# Patient Record
Sex: FEMALE | Race: WHITE | NOT HISPANIC OR LATINO | Employment: UNEMPLOYED | URBAN - NONMETROPOLITAN AREA
[De-identification: names, ages, dates, MRNs, and addresses within clinical notes are randomized per-mention and may not be internally consistent; named-entity substitution may affect disease eponyms.]

---

## 2022-11-10 ENCOUNTER — TELEPHONE (OUTPATIENT)
Dept: CARDIOLOGY | Facility: CLINIC | Age: 57
End: 2022-11-10

## 2022-11-10 NOTE — TELEPHONE ENCOUNTER
PATIENT HAS ANEW PATIENT APPT. IN JAN. NOTIFIED HER TO CONTACT HER PCP TO ADDRESS B/P SINCE SHE IS NOT ESTABLISHED HERE YET. VERBALIZED OK. PH,LPN

## 2022-11-10 NOTE — TELEPHONE ENCOUNTER
Caller: Makayla Chan    Relationship to patient: Self    Best call back number: [phone]    Patient is needing: HUB WAS UNABLE TO TRANSFER TO CLINICAL TEAM. PT BP IS RUNNING AT ABOUT 147/42 AND THEN AGAIN 102/ 45. AND HAS BEEN GOING FROM LOW TO HIGH SINCE BEING SICK. PT HAS A NEW PATIENT APPT. SCHEDULED ON 1.3.22, AND PREVIOUSLY HAD AN APPT SCHEDULED ON 11.9.22 THAT WAS RESCHEDULED DUE TO BEING SICK. PLEASE ADVISE PT ON FURTHER STEPS THANK YOU.

## 2023-01-03 ENCOUNTER — OFFICE VISIT (OUTPATIENT)
Dept: CARDIOLOGY | Facility: CLINIC | Age: 58
End: 2023-01-03
Payer: COMMERCIAL

## 2023-01-03 ENCOUNTER — TELEPHONE (OUTPATIENT)
Dept: CARDIOLOGY | Facility: CLINIC | Age: 58
End: 2023-01-03
Payer: COMMERCIAL

## 2023-01-03 VITALS
HEART RATE: 83 BPM | OXYGEN SATURATION: 98 % | SYSTOLIC BLOOD PRESSURE: 129 MMHG | WEIGHT: 155 LBS | DIASTOLIC BLOOD PRESSURE: 78 MMHG

## 2023-01-03 DIAGNOSIS — R06.02 SHORTNESS OF BREATH: ICD-10-CM

## 2023-01-03 DIAGNOSIS — R07.2 PRECORDIAL PAIN: Primary | ICD-10-CM

## 2023-01-03 DIAGNOSIS — Z95.0 PACEMAKER: ICD-10-CM

## 2023-01-03 DIAGNOSIS — I10 PRIMARY HYPERTENSION: ICD-10-CM

## 2023-01-03 PROCEDURE — 1159F MED LIST DOCD IN RCRD: CPT | Performed by: PHYSICIAN ASSISTANT

## 2023-01-03 PROCEDURE — 1160F RVW MEDS BY RX/DR IN RCRD: CPT | Performed by: PHYSICIAN ASSISTANT

## 2023-01-03 PROCEDURE — 99204 OFFICE O/P NEW MOD 45 MIN: CPT | Performed by: PHYSICIAN ASSISTANT

## 2023-01-03 RX ORDER — HYDROCODONE BITARTRATE AND ACETAMINOPHEN 10; 325 MG/1; MG/1
TABLET ORAL
COMMUNITY
Start: 2022-12-19

## 2023-01-03 RX ORDER — NALOXONE HYDROCHLORIDE 4 MG/.1ML
1 SPRAY NASAL AS NEEDED
COMMUNITY

## 2023-01-03 RX ORDER — OMEPRAZOLE 20 MG/1
20 CAPSULE, DELAYED RELEASE ORAL DAILY
COMMUNITY

## 2023-01-03 RX ORDER — CLONAZEPAM 1 MG/1
TABLET ORAL
COMMUNITY
Start: 2022-12-22

## 2023-01-03 RX ORDER — ATORVASTATIN CALCIUM 20 MG/1
20 TABLET, FILM COATED ORAL DAILY
COMMUNITY
Start: 2022-12-27

## 2023-01-03 RX ORDER — MELOXICAM 7.5 MG/1
TABLET ORAL
COMMUNITY
Start: 2022-10-10

## 2023-01-03 RX ORDER — MELATONIN
800 DAILY
COMMUNITY

## 2023-01-03 RX ORDER — TIZANIDINE HYDROCHLORIDE 2 MG/1
2 CAPSULE, GELATIN COATED ORAL 2 TIMES DAILY PRN
COMMUNITY

## 2023-01-03 RX ORDER — LISINOPRIL AND HYDROCHLOROTHIAZIDE 20; 12.5 MG/1; MG/1
1 TABLET ORAL DAILY
COMMUNITY
Start: 2022-12-16 | End: 2023-03-09

## 2023-01-03 NOTE — PROGRESS NOTES
Subjective   Makayla Chan is a 57 y.o. female     Chief Complaint   Patient presents with   • Establish Care     Cardihcanell BARTON    The patient presents to the clinic today to establish cardiovascular care.  She has requested change of cardiac services, and presents today in that setting.  Previous history includes catheterization in approximately 2018 by report.  This supported nonobstructive disease at most.  The patient also had a pacemaker placement for severe symptomatic bradycardia/conduction system disease.  Last interrogation that I have available supported normal function with adequate battery life.  She has had history of hypokalemia which prompted discontinuation of diuretic therapy.  She feels that she has been mostly normotensive on medical regimen since.  Her last cardiac evaluation occurred approximately a year and a half ago where stress and echo studies were mostly unremarkable at that time.  She is mostly concerned because of recent symptoms.  The patient reports episodes of chest pain with associated mid back, bilateral neck pain, and bilateral ear pain.  This tends to originate in the ear and refer to the previous areas as mentioned.  She gets very dyspneic at that time.  She has a sensation of weakness and nausea at times as well with that.  Occasionally she will have palpitations but certainly no sustained dysrhythmic activity.  The patient reports no other symptoms and would like further evaluation, presenting today in that setting.    Current Outpatient Medications   Medication Sig Dispense Refill   • atorvastatin (LIPITOR) 20 MG tablet 20 mg Daily.     • cholecalciferol (VITAMIN D3) 25 MCG (1000 UT) tablet Take 800 Units by mouth Daily.     • clonazePAM (KlonoPIN) 1 MG tablet      • HYDROcodone-acetaminophen (NORCO)  MG per tablet      • lisinopril-hydrochlorothiazide (PRINZIDE,ZESTORETIC) 20-12.5 MG per tablet 1 tablet Daily.     • meloxicam (MOBIC) 7.5 MG tablet      •  naloxone (NARCAN) 4 MG/0.1ML nasal spray 1 spray into the nostril(s) as directed by provider As Needed.     • omeprazole (priLOSEC) 20 MG capsule Take 20 mg by mouth Daily.     • TiZANidine (ZANAFLEX) 2 MG capsule Take 2 mg by mouth 2 (Two) Times a Day As Needed for Muscle Spasms.       No current facility-administered medications for this visit.       Oxycodone    Past Medical History:   Diagnosis Date   • Nerve damage     Damian hands and arms       Social History     Socioeconomic History   • Marital status: Single   Tobacco Use   • Smoking status: Former     Types: Cigarettes     Quit date:      Years since quittin.0   • Smokeless tobacco: Never   Substance and Sexual Activity   • Alcohol use: Never   • Drug use: Never   • Sexual activity: Defer       Family History   Problem Relation Age of Onset   • Heart disease Mother    • Heart disease Father        Review of Systems   Eyes: Positive for visual disturbance ( glaucoma).   Respiratory: Positive for chest tightness and wheezing. Negative for apnea, cough and shortness of breath.    Cardiovascular: Positive for leg swelling. Negative for chest pain and palpitations.   Gastrointestinal: Negative for blood in stool.   Endocrine: Positive for cold intolerance and heat intolerance.   Genitourinary: Negative for hematuria.   Musculoskeletal: Positive for arthralgias, back pain, gait problem, joint swelling, neck pain and neck stiffness.   Skin: Negative.  Negative for color change, rash and wound.   Allergic/Immunologic: Negative.  Negative for environmental allergies and food allergies.   Neurological: Positive for dizziness. Negative for syncope, weakness, light-headedness, numbness and headaches.   Hematological: Does not bruise/bleed easily.   Psychiatric/Behavioral: Positive for sleep disturbance.       Objective     Vitals:    23 1326   BP: 129/78   BP Location: Left arm   Patient Position: Sitting   Cuff Size: Adult   Pulse: 83   SpO2: 98%    Weight: 70.3 kg (155 lb)        /78 (BP Location: Left arm, Patient Position: Sitting, Cuff Size: Adult)   Pulse 83   Wt 70.3 kg (155 lb)   SpO2 98%      Lab Results (most recent)     None          Physical Exam  Vitals and nursing note reviewed.   Constitutional:       General: She is not in acute distress.     Appearance: She is well-developed.   HENT:      Head: Normocephalic and atraumatic.   Eyes:      Conjunctiva/sclera: Conjunctivae normal.      Pupils: Pupils are equal, round, and reactive to light.   Neck:      Vascular: No JVD.      Trachea: No tracheal deviation.   Cardiovascular:      Rate and Rhythm: Normal rate and regular rhythm.      Heart sounds: Normal heart sounds.   Pulmonary:      Effort: Pulmonary effort is normal.      Breath sounds: Normal breath sounds.   Abdominal:      General: Bowel sounds are normal. There is no distension.      Palpations: Abdomen is soft. There is no mass.      Tenderness: There is no abdominal tenderness. There is no guarding or rebound.   Musculoskeletal:         General: No tenderness or deformity. Normal range of motion.      Cervical back: Normal range of motion and neck supple.   Skin:     General: Skin is warm and dry.      Coloration: Skin is not pale.      Findings: No erythema or rash.   Neurological:      Mental Status: She is alert and oriented to person, place, and time.   Psychiatric:         Behavior: Behavior normal.         Thought Content: Thought content normal.         Judgment: Judgment normal.         Procedure   Procedures         Assessment & Plan      Diagnosis Plan   1. Precordial pain  Overnight Sleep Oximetry Study    Stress Test With Myocardial Perfusion (1 Day)    Adult Transthoracic Echo Complete W/ Cont if Necessary Per Protocol      2. Shortness of breath  Overnight Sleep Oximetry Study    Stress Test With Myocardial Perfusion (1 Day)    Adult Transthoracic Echo Complete W/ Cont if Necessary Per Protocol      3. Pacemaker         4. Primary hypertension          1.  With chest discomfort and the patient's concern of coronary disease, I would like to repeat an ischemia assessment.  We will schedule for nuclear stress test.  Given pacemaker status and exercise intolerance, we will schedule for Lexiscan nuclear stress test.    2.  I would schedule for an echo as well to evaluate LV size and function, valvular morphologies, and cardiac structure otherwise.    3.  We will schedule the patient for routine pacer interrogations through the clinic.    4.  Not noted above, because of her level of fatigue and snoring at night otherwise, we will schedule for overnight oximetry to screen for undiagnosed sleep disorder.    5.  We will continue medications for now without change.    6.  We will see the patient back to review all the above and recommend further at that time.  She will call for any complications prior to follow-up.  If the above is unremarkable and symptoms persist, I would then consider empiric therapy directed towards vasospastic angina.           Patient brought in medicine list to appointment, it's been reviewed with patient and med list was updated in the chart.       Electronically signed by:

## 2023-01-06 ENCOUNTER — OFFICE VISIT (OUTPATIENT)
Dept: CARDIOLOGY | Facility: CLINIC | Age: 58
End: 2023-01-06
Payer: COMMERCIAL

## 2023-01-06 DIAGNOSIS — I45.9 CARDIAC CONDUCTION DISORDER: Primary | ICD-10-CM

## 2023-01-06 PROCEDURE — 93280 PM DEVICE PROGR EVAL DUAL: CPT | Performed by: INTERNAL MEDICINE

## 2023-01-18 ENCOUNTER — TELEPHONE (OUTPATIENT)
Dept: CARDIOLOGY | Facility: CLINIC | Age: 58
End: 2023-01-18
Payer: COMMERCIAL

## 2023-01-18 NOTE — TELEPHONE ENCOUNTER
----- Message from GARY Williamson sent at 1/9/2023  6:51 PM EST -----  No results available.     ----- Message -----  From: Florencia North RegSched Rep  Sent: 1/6/2023  11:12 AM EST  To: GARY Williamson

## 2023-01-23 ENCOUNTER — TELEPHONE (OUTPATIENT)
Dept: CARDIOLOGY | Facility: CLINIC | Age: 58
End: 2023-01-23
Payer: COMMERCIAL

## 2023-01-23 NOTE — TELEPHONE ENCOUNTER
[facility]  Pt notified of no acute findings. Provider will discuss results at f/u. Pt reminded of appt date and time.  ----- Message from Chikis Paiz MA sent at 1/23/2023  8:26 AM EST -----    ----- Message -----  From: Quoc Gilman PA  Sent: 1/20/2023   1:55 PM EST  To: Chikis Paiz MA    Routine follow-up.  If symptoms suggested, proceed on with pulmonary evaluation  ----- Message -----  From: Interface, Scans Incoming  Sent: 1/19/2023   5:49 AM EST  To: GARY Williamson

## 2023-02-13 ENCOUNTER — HOSPITAL ENCOUNTER (OUTPATIENT)
Dept: CARDIOLOGY | Facility: HOSPITAL | Age: 58
Discharge: HOME | End: 2023-02-13
Payer: COMMERCIAL

## 2023-02-13 DIAGNOSIS — R06.02 SHORTNESS OF BREATH: ICD-10-CM

## 2023-02-13 DIAGNOSIS — R07.2 PRECORDIAL PAIN: ICD-10-CM

## 2023-02-13 PROCEDURE — 93017 CV STRESS TEST TRACING ONLY: CPT

## 2023-02-13 PROCEDURE — 78452 HT MUSCLE IMAGE SPECT MULT: CPT | Performed by: INTERNAL MEDICINE

## 2023-02-13 PROCEDURE — 0 TECHNETIUM SESTAMIBI: Performed by: INTERNAL MEDICINE

## 2023-02-13 PROCEDURE — 93306 TTE W/DOPPLER COMPLETE: CPT

## 2023-02-13 PROCEDURE — 78452 HT MUSCLE IMAGE SPECT MULT: CPT

## 2023-02-13 PROCEDURE — A9500 TC99M SESTAMIBI: HCPCS | Performed by: INTERNAL MEDICINE

## 2023-02-13 PROCEDURE — 93018 CV STRESS TEST I&R ONLY: CPT | Performed by: INTERNAL MEDICINE

## 2023-02-13 PROCEDURE — 93306 TTE W/DOPPLER COMPLETE: CPT | Performed by: INTERNAL MEDICINE

## 2023-02-13 PROCEDURE — 25010000002 REGADENOSON 0.4 MG/5ML SOLUTION: Performed by: INTERNAL MEDICINE

## 2023-02-13 RX ADMIN — REGADENOSON 0.4 MG: 0.08 INJECTION, SOLUTION INTRAVENOUS at 10:16

## 2023-02-13 RX ADMIN — TECHNETIUM TC 99M SESTAMIBI 1 DOSE: 1 INJECTION INTRAVENOUS at 10:16

## 2023-02-13 RX ADMIN — TECHNETIUM TC 99M SESTAMIBI 1 DOSE: 1 INJECTION INTRAVENOUS at 08:07

## 2023-02-15 LAB
BH CV REST NUCLEAR ISOTOPE DOSE: 10 MCI
BH CV STRESS COMMENTS STAGE 1: NORMAL
BH CV STRESS DOSE REGADENOSON STAGE 1: 0.4
BH CV STRESS DURATION MIN STAGE 1: 0
BH CV STRESS DURATION SEC STAGE 1: 10
BH CV STRESS NUCLEAR ISOTOPE DOSE: 30 MCI
BH CV STRESS PROTOCOL 1: NORMAL
BH CV STRESS RECOVERY BP: NORMAL MMHG
BH CV STRESS RECOVERY HR: 72 BPM
BH CV STRESS STAGE 1: 1
MAXIMAL PREDICTED HEART RATE: 161 BPM
PERCENT MAX PREDICTED HR: 44.1 %
STRESS BASELINE BP: NORMAL MMHG
STRESS BASELINE HR: 60 BPM
STRESS PERCENT HR: 52 %
STRESS POST PEAK BP: NORMAL MMHG
STRESS POST PEAK HR: 71 BPM
STRESS TARGET HR: 137 BPM

## 2023-02-18 LAB
BH CV ECHO MEAS - ACS: 1.95 CM
BH CV ECHO MEAS - AO MAX PG: 8.8 MMHG
BH CV ECHO MEAS - AO MEAN PG: 5.4 MMHG
BH CV ECHO MEAS - AO ROOT DIAM: 3.3 CM
BH CV ECHO MEAS - AO V2 MAX: 148 CM/SEC
BH CV ECHO MEAS - AO V2 VTI: 33.1 CM
BH CV ECHO MEAS - EDV(CUBED): 94.8 ML
BH CV ECHO MEAS - EDV(MOD-SP4): 94.1 ML
BH CV ECHO MEAS - EF(MOD-SP4): 58.3 %
BH CV ECHO MEAS - EF_3D-VOL: 62 %
BH CV ECHO MEAS - ESV(CUBED): 35.6 ML
BH CV ECHO MEAS - ESV(MOD-SP4): 39.2 ML
BH CV ECHO MEAS - FS: 27.9 %
BH CV ECHO MEAS - IVS/LVPW: 0.7 CM
BH CV ECHO MEAS - IVSD: 0.82 CM
BH CV ECHO MEAS - LA DIMENSION: 3.1 CM
BH CV ECHO MEAS - LAT PEAK E' VEL: 6.2 CM/SEC
BH CV ECHO MEAS - LV MASS(C)D: 153.9 GRAMS
BH CV ECHO MEAS - LVIDD: 4.6 CM
BH CV ECHO MEAS - LVIDS: 3.3 CM
BH CV ECHO MEAS - LVPWD: 1.16 CM
BH CV ECHO MEAS - MED PEAK E' VEL: 5.8 CM/SEC
BH CV ECHO MEAS - MR MAX PG: 99.3 MMHG
BH CV ECHO MEAS - MR MAX VEL: 498.2 CM/SEC
BH CV ECHO MEAS - MV A MAX VEL: 85.3 CM/SEC
BH CV ECHO MEAS - MV DEC SLOPE: 319.7 CM/SEC2
BH CV ECHO MEAS - MV E MAX VEL: 87 CM/SEC
BH CV ECHO MEAS - MV E/A: 1.02
BH CV ECHO MEAS - RAP SYSTOLE: 10 MMHG
BH CV ECHO MEAS - RVDD: 2.17 CM
BH CV ECHO MEAS - RVSP: 29.3 MMHG
BH CV ECHO MEAS - SV(MOD-SP4): 54.9 ML
BH CV ECHO MEAS - TR MAX PG: 19.3 MMHG
BH CV ECHO MEAS - TR MAX VEL: 219.9 CM/SEC
BH CV ECHO MEASUREMENTS AVERAGE E/E' RATIO: 14.5
MAXIMAL PREDICTED HEART RATE: 161 BPM
STRESS TARGET HR: 137 BPM

## 2023-02-20 ENCOUNTER — TELEPHONE (OUTPATIENT)
Dept: CARDIOLOGY | Facility: CLINIC | Age: 58
End: 2023-02-20
Payer: COMMERCIAL

## 2023-02-20 DIAGNOSIS — R07.2 PRECORDIAL PAIN: ICD-10-CM

## 2023-02-20 DIAGNOSIS — R94.39 ABNORMAL STRESS TEST: Primary | ICD-10-CM

## 2023-02-20 DIAGNOSIS — R06.02 SHORTNESS OF BREATH: ICD-10-CM

## 2023-02-20 RX ORDER — METOPROLOL TARTRATE 100 MG/1
TABLET ORAL
Qty: 2 TABLET | Refills: 0 | Status: SHIPPED | OUTPATIENT
Start: 2023-02-20

## 2023-02-20 RX ORDER — NITROGLYCERIN 0.4 MG/1
TABLET SUBLINGUAL
Qty: 25 TABLET | Refills: 0 | Status: SHIPPED | OUTPATIENT
Start: 2023-02-20

## 2023-02-20 NOTE — TELEPHONE ENCOUNTER
Pt LVM on triage requesting her test results. Also stated that she had CP Thursday and has to take nitro. Stated she wanted to know if her results explain why that's occurring.   #[phone]        Per chart review, results are in and ischemia is mentioned on stress test.

## 2023-02-20 NOTE — TELEPHONE ENCOUNTER
Patient has been notified of test results/ instructions and is willing to move forward with testing. Denies contrast dye allergy.    Chikis Paiz MA

## 2023-02-20 NOTE — TELEPHONE ENCOUNTER
----- Message from GARY Williamson sent at 2/17/2023  4:27 PM EST -----  Cardiac CTA.  ----- Message -----  From: Deniz Lucio MD  Sent: 2/15/2023   7:57 PM EST  To: GARY Williamson    Result Text  1.  Scintigraphy demonstrates a small, mild reversible defect compatible with ischemia confined to the apical segment.    2.  Preserved post stress ejection fraction of 57% with no focal wall motion abnormalities.    3.  No evidence of pharmacologically induced transient ischemic dilation or of increased lung uptake of radiopharmaceutical.

## 2023-02-27 ENCOUNTER — TELEPHONE (OUTPATIENT)
Dept: CARDIOLOGY | Facility: CLINIC | Age: 58
End: 2023-02-27
Payer: COMMERCIAL

## 2023-02-27 NOTE — TELEPHONE ENCOUNTER
----- Message from GARY Williamson sent at 2/24/2023  3:04 PM EST -----  CT angiogram pending.  Follow-up after.  ----- Message -----  From: Deniz Lucio MD  Sent: 2/18/2023  10:25 PM EST  To: GARY Williamson        Result Text     •  Left ventricular ejection fraction appears to be 56 - 60%.  •  Left ventricular diastolic function is consistent with (grade II w/high LAP) pseudonormalization.  •  The right atrial cavity is mildly  dilated.  •  Estimated right ventricular systolic pressure from tricuspid regurgitation is normal (<35 mmHg).     Technically adequate study.    1.  LV size, function, wall motion, wall thickness are normal with LV systolic function at the lower limits of normal.  Visually estimated ejection fraction is 45 to 50%.  3D ejection fraction is 62%.  Grade 2 diastolic dysfunction.  Mild left atrial enlargement.  Mild right atrial enlargement.  RV size and function are grossly preserved.  Pacing or defibrillator leads are identified in the right heart.    2.  Valves are morphologically normal with no stenotic lesions or valve associated masses or thrombi.  There is mild to moderate MR, trivial AI, and trivial TR.    3.  No pericardial or great vessel pathology.    4.  Pulmonary artery systolic pressures are estimated approximately 30 mmHg

## 2023-03-09 ENCOUNTER — OFFICE VISIT (OUTPATIENT)
Dept: CARDIOLOGY | Facility: CLINIC | Age: 58
End: 2023-03-09
Payer: COMMERCIAL

## 2023-03-09 VITALS
WEIGHT: 149 LBS | SYSTOLIC BLOOD PRESSURE: 148 MMHG | DIASTOLIC BLOOD PRESSURE: 86 MMHG | HEART RATE: 72 BPM | OXYGEN SATURATION: 97 %

## 2023-03-09 DIAGNOSIS — R06.02 SHORTNESS OF BREATH: Primary | ICD-10-CM

## 2023-03-09 DIAGNOSIS — R94.39 ABNORMAL STRESS TEST: ICD-10-CM

## 2023-03-09 DIAGNOSIS — R07.2 PRECORDIAL PAIN: ICD-10-CM

## 2023-03-09 DIAGNOSIS — I10 PRIMARY HYPERTENSION: ICD-10-CM

## 2023-03-09 PROCEDURE — 3077F SYST BP >= 140 MM HG: CPT | Performed by: PHYSICIAN ASSISTANT

## 2023-03-09 PROCEDURE — 3079F DIAST BP 80-89 MM HG: CPT | Performed by: PHYSICIAN ASSISTANT

## 2023-03-09 PROCEDURE — 99214 OFFICE O/P EST MOD 30 MIN: CPT | Performed by: PHYSICIAN ASSISTANT

## 2023-03-09 PROCEDURE — 93000 ELECTROCARDIOGRAM COMPLETE: CPT | Performed by: PHYSICIAN ASSISTANT

## 2023-03-09 RX ORDER — RANOLAZINE 1000 MG/1
1000 TABLET, EXTENDED RELEASE ORAL 2 TIMES DAILY
Qty: 60 TABLET | Refills: 5 | Status: SHIPPED | OUTPATIENT
Start: 2023-03-09

## 2023-03-09 RX ORDER — LISINOPRIL 40 MG/1
40 TABLET ORAL DAILY
COMMUNITY

## 2023-03-09 RX ORDER — ASPIRIN 81 MG/1
81 TABLET ORAL DAILY
Qty: 30 TABLET | Refills: 5 | Status: SHIPPED | OUTPATIENT
Start: 2023-03-09

## 2023-03-09 NOTE — PROGRESS NOTES
"Problem list     Subjective   Makayla Chan is a 58 y.o. female     Chief Complaint   Patient presents with   • Chest Pain   Problem list  1.  Nonobstructive coronary disease  1.1 stress test February 2023 with apical ischemia noted and preserved LV function  2.  Preserved systolic function  3.  Hypertension  4.  Dyslipidemia  5.  Symptomatic bradycardia status post pacemaker implant      HPI    Patient is a 58-year-old female who presented into the office today emergently because of chest pain.    She apparently was to come in for a nurse visit but was having significant pain.  She apparently during the night felt discomfort in her head that seem to go down into her chest.  She become diaphoretic and felt heaviness.  She become short of breath and symptoms have been very concerning.  She presents today for evaluation.    She describes her pain currently as a \"3\" on a 1-10 pain scale.  She was given nitroglycerin here in the emergency department and that has improved her symptoms although it is still present.    She does not describe being short of breath now.  She does not describe PND orthopnea.  No complaints of palpitations or dysrhythmic symptoms.  Otherwise, patient is doing well.    Current Outpatient Medications on File Prior to Visit   Medication Sig Dispense Refill   • atorvastatin (LIPITOR) 20 MG tablet 1 tablet Daily.     • cholecalciferol (VITAMIN D3) 25 MCG (1000 UT) tablet Take 800 Units by mouth Daily.     • clonazePAM (KlonoPIN) 1 MG tablet      • HYDROcodone-acetaminophen (NORCO)  MG per tablet      • lisinopril (PRINIVIL,ZESTRIL) 40 MG tablet Take 1 tablet by mouth Daily.     • meloxicam (MOBIC) 7.5 MG tablet      • metoprolol tartrate (LOPRESSOR) 100 MG tablet Take one tablet at 7 am morning of CTA. Radiology will let you know when the to take the other tablet. 2 tablet 0   • naloxone (NARCAN) 4 MG/0.1ML nasal spray 1 spray into the nostril(s) as directed by provider As Needed.     • " nitroglycerin (NITROSTAT) 0.4 MG SL tablet 1 under the tongue as needed for angina, may repeat q5mins for up three doses 25 tablet 0   • omeprazole (priLOSEC) 20 MG capsule Take 1 capsule by mouth Daily.     • TiZANidine (ZANAFLEX) 2 MG capsule Take 1 capsule by mouth 2 (Two) Times a Day As Needed for Muscle Spasms.     • [DISCONTINUED] lisinopril-hydrochlorothiazide (PRINZIDE,ZESTORETIC) 20-12.5 MG per tablet 1 tablet Daily.       No current facility-administered medications on file prior to visit.       Oxycodone    Past Medical History:   Diagnosis Date   • Nerve damage     Damian hands and arms       Social History     Socioeconomic History   • Marital status: Single   Tobacco Use   • Smoking status: Former     Types: Cigarettes     Quit date:      Years since quittin.   • Smokeless tobacco: Never   Substance and Sexual Activity   • Alcohol use: Never   • Drug use: Never   • Sexual activity: Defer       Family History   Problem Relation Age of Onset   • Heart disease Mother    • Heart disease Father        Review of Systems   Constitutional: Negative for activity change, appetite change, chills and fever.   HENT: Negative for congestion.    Eyes: Negative.  Negative for visual disturbance.   Respiratory: Negative for apnea and chest tightness.    Cardiovascular: Positive for chest pain (Pain started last PM). Negative for palpitations and leg swelling.   Gastrointestinal: Negative.  Negative for blood in stool.   Endocrine: Positive for cold intolerance and heat intolerance.   Genitourinary: Negative.  Negative for hematuria.   Skin: Negative for color change, rash and wound.   Allergic/Immunologic: Negative.  Negative for environmental allergies and food allergies.   Neurological: Positive for dizziness, weakness, light-headedness and headaches. Negative for syncope and numbness.   Hematological: Negative.  Does not bruise/bleed easily.   Psychiatric/Behavioral: Positive for sleep disturbance.        Objective   Vitals:    03/09/23 1020   BP: 148/86   BP Location: Left arm   Patient Position: Sitting   Cuff Size: Adult   Pulse: 72   SpO2: 97%   Weight: 67.6 kg (149 lb)      /86 (BP Location: Left arm, Patient Position: Sitting, Cuff Size: Adult)   Pulse 72   Wt 67.6 kg (149 lb)   SpO2 97%    Lab Results (most recent)     None        Physical Exam  Vitals and nursing note reviewed.   Constitutional:       General: She is not in acute distress.     Appearance: Normal appearance. She is well-developed.   HENT:      Head: Normocephalic and atraumatic.   Eyes:      General: No scleral icterus.        Right eye: No discharge.         Left eye: No discharge.      Conjunctiva/sclera: Conjunctivae normal.   Neck:      Vascular: No carotid bruit.   Cardiovascular:      Rate and Rhythm: Normal rate and regular rhythm.      Heart sounds: Normal heart sounds. No murmur heard.    No friction rub. No gallop.   Pulmonary:      Effort: Pulmonary effort is normal. No respiratory distress.      Breath sounds: Normal breath sounds. No wheezing or rales.   Chest:      Chest wall: No tenderness.   Musculoskeletal:      Right lower leg: No edema.      Left lower leg: No edema.   Skin:     General: Skin is warm and dry.      Coloration: Skin is not pale.      Findings: No erythema or rash.   Neurological:      Mental Status: She is alert and oriented to person, place, and time.      Cranial Nerves: No cranial nerve deficit.   Psychiatric:         Behavior: Behavior normal.           Procedure     ECG 12 Lead    Date/Time: 3/9/2023 9:50 AM  Performed by: Sav Sotelo PA  Authorized by: Sav Sotelo PA   Comparison: compared with previous ECG from 3/9/2023  Comments: EKG demonstrates ventricular pacing with no acute ST changes               Assessment & Plan      Diagnosis Plan   1. Shortness of breath        2. Abnormal stress test  CT Angiogram Coronary      3. Precordial pain  CT Angiogram Coronary      4.  Primary hypertension            Recommendation  1.  Patient is a 58-year-old female who presents back to the office for follow-up.  Patient has chest pain, shortness of breath with current chest pain at this time.  We are sending her to the emergency room for evaluation.  We have called report to them at this time.    2.  Otherwise, patient will be seen after hospitalization.  Follow-up with primary as scheduled.         Patient did not bring med list or medicine bottles to appointment, med list has been reviewed and updated based on patient's knowledge of their meds.         Electronically signed by:  Sav Sotelo

## 2023-03-27 ENCOUNTER — TELEPHONE (OUTPATIENT)
Dept: CARDIOLOGY | Facility: CLINIC | Age: 58
End: 2023-03-27
Payer: COMMERCIAL

## 2023-03-27 DIAGNOSIS — R94.39 ABNORMAL STRESS TEST: ICD-10-CM

## 2023-03-27 DIAGNOSIS — R07.2 PRECORDIAL PAIN: ICD-10-CM

## 2023-03-27 DIAGNOSIS — R06.02 SHORTNESS OF BREATH: ICD-10-CM

## 2023-03-27 DIAGNOSIS — R93.89 ABNORMAL CT SCAN: Primary | ICD-10-CM

## 2023-03-27 NOTE — TELEPHONE ENCOUNTER
PT called to check on CTA results, told patient we did not have reuslts that I would pull from hospital and let Quoc know. I have scanned and attached the report, they are in patient's chart for Quoc to review and call patient. Thank you.

## 2023-03-28 NOTE — TELEPHONE ENCOUNTER
Per Quoc Gilman,PAC Verbal would recommend moving forward with cath at this time. Patient verbally understands and is willing to move forward.

## 2023-03-30 NOTE — TELEPHONE ENCOUNTER
Per Quoc Gilman,PAC Verbal okay to wait for  as long as she is not having any sx. Patient has been notified verbally understands.    Pt was offered a sooner cardiac cath appointment with an alternate interventional cardiologist (Gonzales Hare Tahir, Rukavina or [facility]).  Pt declined and opted to wait for Dr. Lucio's next cardiac cath appointment.  The provider explained the risk associated with delaying the cardiac cath and encouraged the patient to have the cardiac cath performed ASAP with the alternate provider.      Patient is going to come in, in the am to go over instructions.    SAE HURST GAVE INSTRUCTIONS AS I WAS OUT OF OFFICE WHEN PATIENT ARRIVED.

## 2023-04-17 ENCOUNTER — TELEPHONE (OUTPATIENT)
Dept: CARDIOLOGY | Facility: CLINIC | Age: 58
End: 2023-04-17
Payer: COMMERCIAL

## 2023-04-17 NOTE — TELEPHONE ENCOUNTER
Caller: Makayla Chan    Relationship: Self    Best call back number: [phone]    What orders are you requesting (i.e. lab or imaging): PRE-CATH LABS    In what timeframe would the patient need to come in: BETWEEN 4.19 & 5.04    Where will you receive your lab/imaging services: [facility] - FAX NUMBER [phone] - PHONE [phone]    Additional notes: HAS AN APPT W/ [facility] 5.2.23

## 2023-05-19 ENCOUNTER — OUTSIDE FACILITY SERVICE (OUTPATIENT)
Dept: CARDIOLOGY | Facility: CLINIC | Age: 58
End: 2023-05-19
Payer: COMMERCIAL

## 2023-05-19 DIAGNOSIS — R06.02 SHORTNESS OF BREATH: ICD-10-CM

## 2023-05-19 DIAGNOSIS — R07.2 PRECORDIAL PAIN: ICD-10-CM

## 2023-05-19 DIAGNOSIS — R94.39 ABNORMAL STRESS TEST: ICD-10-CM

## 2023-05-19 DIAGNOSIS — R93.89 ABNORMAL CT SCAN: ICD-10-CM

## 2023-06-19 ENCOUNTER — OFFICE VISIT (OUTPATIENT)
Dept: CARDIOLOGY | Facility: CLINIC | Age: 58
End: 2023-06-19
Payer: COMMERCIAL

## 2023-06-19 VITALS
DIASTOLIC BLOOD PRESSURE: 84 MMHG | WEIGHT: 162 LBS | OXYGEN SATURATION: 96 % | HEART RATE: 89 BPM | SYSTOLIC BLOOD PRESSURE: 154 MMHG

## 2023-06-19 DIAGNOSIS — I10 PRIMARY HYPERTENSION: ICD-10-CM

## 2023-06-19 DIAGNOSIS — R07.2 PRECORDIAL PAIN: Primary | ICD-10-CM

## 2023-06-19 DIAGNOSIS — R06.02 SHORTNESS OF BREATH: ICD-10-CM

## 2023-06-19 PROCEDURE — 99213 OFFICE O/P EST LOW 20 MIN: CPT | Performed by: PHYSICIAN ASSISTANT

## 2023-06-19 PROCEDURE — 1160F RVW MEDS BY RX/DR IN RCRD: CPT | Performed by: PHYSICIAN ASSISTANT

## 2023-06-19 PROCEDURE — 3077F SYST BP >= 140 MM HG: CPT | Performed by: PHYSICIAN ASSISTANT

## 2023-06-19 PROCEDURE — 1159F MED LIST DOCD IN RCRD: CPT | Performed by: PHYSICIAN ASSISTANT

## 2023-06-19 PROCEDURE — 3079F DIAST BP 80-89 MM HG: CPT | Performed by: PHYSICIAN ASSISTANT

## 2023-06-19 RX ORDER — ISOSORBIDE MONONITRATE 30 MG/1
30 TABLET, EXTENDED RELEASE ORAL EVERY MORNING
Qty: 90 TABLET | Refills: 3 | Status: SHIPPED | OUTPATIENT
Start: 2023-06-19

## 2023-06-19 RX ORDER — PHENOL 1.4 %
800 AEROSOL, SPRAY (ML) MUCOUS MEMBRANE DAILY
COMMUNITY
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
## 2023-09-15 ENCOUNTER — TELEPHONE (OUTPATIENT)
Dept: CARDIOLOGY | Facility: CLINIC | Age: 58
End: 2023-09-15
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
## 2023-09-18 ENCOUNTER — TELEPHONE (OUTPATIENT)
Dept: CARDIOLOGY | Facility: CLINIC | Age: 58
End: 2023-09-18
Payer: COMMERCIAL

## 2023-09-18 NOTE — TELEPHONE ENCOUNTER
Caller: Makayla Chan    Relationship: Self    Best call back number: [phone]     What is the best time to reach you: ANYTIME    What was the call regarding: PATIENT MISSED A CALL ON 09.18.23. THERE IS NO NOTE IN THE CHART.

## 2023-09-18 NOTE — TELEPHONE ENCOUNTER
Called lvm of new appt date and times. Unable to get both on same day this time. Mailed out reminders as well.

## 2023-10-06 ENCOUNTER — OFFICE VISIT (OUTPATIENT)
Dept: CARDIOLOGY | Facility: CLINIC | Age: 58
End: 2023-10-06
Payer: COMMERCIAL

## 2023-10-06 DIAGNOSIS — I45.9 CARDIAC CONDUCTION DISORDER: ICD-10-CM

## 2023-10-06 DIAGNOSIS — Z95.0 PACEMAKER: Primary | ICD-10-CM

## 2023-10-12 ENCOUNTER — TELEPHONE (OUTPATIENT)
Dept: CARDIOLOGY | Facility: CLINIC | Age: 58
End: 2023-10-12
Payer: COMMERCIAL

## 2023-10-12 NOTE — LETTER
October 16, 2023       To Whom It May Concern:    We build our practice on integrity and patient care. The highest compliment we can receive is the referral of friends, family and patients to our office. We want to take this time to thank you for considering our group as part of your care team.    The medical records for Makayla Chan 1965 were reviewed for pre-operative clearance on 10/16/2023. It has been determined that this patient has:  ACCEPTABLE RISK.    Makayla Chan is currently on the following medications that will need to be held prior to surgery: MAY HOLD ASPIRIN FOR 2 DAYS PRIOR TO PROCEDURE IF NEEDED.    This pre-operative evaluation has been completed based on patient reported medical conditions at the date of this letter, this evaluation may have considered in part results of additional testing, completion of an EKG and patient reported symptoms at the time of their visit.    Makayla Chan's pre-operative clearance is good for thirty(30) days from the date of this letter with no reported changes in health, symptoms or diagnosis from the patient, their primary care provider or your office.    Your office has reported the patient will under go ORAL SURGERY WITH LOCAL ANESTHESIA on TO BE DETERMINED with Dr. TORRES. If this appointment is changed or moved outside of thirty(30) days from 10/16/2023 this pre-operative clearance is void.    If you have any further questions please give our office a call.    Thank you for your trust,      GARY Harrington

## 2023-10-12 NOTE — TELEPHONE ENCOUNTER
Received cardiac clearance request from  stating pt has oral surgery to be scheduled and is requiring a cardiac clearance. Placed cardiac clearance request in Christina's inbox to review and address with provider.

## 2023-11-28 RX ORDER — ISOSORBIDE MONONITRATE 30 MG/1
30 TABLET, EXTENDED RELEASE ORAL EVERY MORNING
Qty: 90 TABLET | Refills: 3 | Status: SHIPPED | OUTPATIENT
Start: 2023-11-28

## 2023-11-28 NOTE — TELEPHONE ENCOUNTER
Caller: Makayla Chan    Relationship: Self    Best call back number: [phone]    Requested Prescriptions:   Requested Prescriptions     Pending Prescriptions Disp Refills    isosorbide mononitrate (IMDUR) 30 MG 24 hr tablet 90 tablet 3     Sig: Take 1 tablet by mouth Every Morning.        Pharmacy where request should be sent: [facility] [id number] [place] [id number] - [phone] [facility] [phone]      Last office visit with prescribing clinician: 6/19/2023   Last telemedicine visit with prescribing clinician: Visit date not found   Next office visit with prescribing clinician: 5/16/2024     Additional details provided by patient: PATIENT HAS 21 PILLS LEFT    Does the patient have less than a 3 day supply:  [] Yes  [x] No    Would you like a call back once the refill request has been completed: [] Yes [] No    If the office needs to give you a call back, can they leave a voicemail: [] Yes [] No    Kristie Madrid Rep   11/28/23 11:59 EST

## 2023-11-28 NOTE — LETTER
November 29, 2023       To Whom It May Concern:    We build our practice on integrity and patient care. The highest compliment we can receive is the referral of friends, family and patients to our office. We want to take this time to thank you for considering our group as part of your care team.    The medical records for Makayla Chan 1965 were reviewed for pre-operative clearance on 11/29/2023. It has been determined that this patient has:  ACCEPTABLE RISK.    Makayla Chan is currently on the following medications that will need to be held prior to surgery: MAY HOLD ASPIRIN IF NEEDED 2 TO 3 DAYS PRIOR TO PROCEDURE.    This pre-operative evaluation has been completed based on patient reported medical conditions at the date of this letter, this evaluation may have considered in part results of additional testing, completion of an EKG and patient reported symptoms at the time of their visit.    Makayla Chan's pre-operative clearance is good for thirty(30) days from the date of this letter with no reported changes in health, symptoms or diagnosis from the patient, their primary care provider or your office.    Your office has reported the patient will under go ORAL SURGERY  on TO BE DETERMINED with Dr. TORRES. If this appointment is changed or moved outside of thirty(30) days from 11/29/2023 this pre-operative clearance is void.    If you have any further questions please give our office a call.    Thank you for your trust,      GARY Harrington

## 2023-12-21 ENCOUNTER — TELEPHONE (OUTPATIENT)
Dept: CARDIOLOGY | Facility: CLINIC | Age: 58
End: 2023-12-21
Payer: COMMERCIAL

## 2023-12-21 NOTE — LETTER
December 22, 2023       To Whom It May Concern:    We build our practice on integrity and patient care. The highest compliment we can receive is the referral of friends, family and patients to our office. We want to take this time to thank you for considering our group as part of your care team.    The medical records for Makayla Chan 1965 were reviewed for pre-operative clearance on 12/22/2023. It has been determined that this patient has:  ACCEPTABLE RISK.    Makayla Chan is currently on the following medications that will need to be held prior to surgery: CONTINUE ASPIRIN DAILY.    This pre-operative evaluation has been completed based on patient reported medical conditions at the date of this letter, this evaluation may have considered in part results of additional testing, completion of an EKG and patient reported symptoms at the time of their visit.    Makayla Chan's pre-operative clearance is good for thirty(30) days from the date of this letter with no reported changes in health, symptoms or diagnosis from the patient, their primary care provider or your office.    Your office has reported the patient will under go ORAL SURGERY WITH LOCAL ANESTHESIA on TO BE DETERMINED with Dr. TORRES. If this appointment is changed or moved outside of thirty(30) days from 12/22/2023 this pre-operative clearance is void.    If you have any further questions please give our office a call.    Thank you for your trust,      GARY Harrington

## 2024-08-15 ENCOUNTER — OFFICE VISIT (OUTPATIENT)
Dept: CARDIOLOGY | Facility: CLINIC | Age: 59
End: 2024-08-15
Payer: COMMERCIAL

## 2024-08-15 VITALS
WEIGHT: 157 LBS | DIASTOLIC BLOOD PRESSURE: 63 MMHG | OXYGEN SATURATION: 98 % | HEART RATE: 93 BPM | SYSTOLIC BLOOD PRESSURE: 110 MMHG

## 2024-08-15 DIAGNOSIS — I10 PRIMARY HYPERTENSION: Primary | ICD-10-CM

## 2024-08-15 DIAGNOSIS — E78.00 PURE HYPERCHOLESTEROLEMIA: ICD-10-CM

## 2024-08-15 DIAGNOSIS — I20.1 VASOSPASTIC ANGINA: ICD-10-CM

## 2024-08-15 PROCEDURE — 3078F DIAST BP <80 MM HG: CPT | Performed by: PHYSICIAN ASSISTANT

## 2024-08-15 PROCEDURE — 93000 ELECTROCARDIOGRAM COMPLETE: CPT | Performed by: PHYSICIAN ASSISTANT

## 2024-08-15 PROCEDURE — 99213 OFFICE O/P EST LOW 20 MIN: CPT | Performed by: PHYSICIAN ASSISTANT

## 2024-08-15 PROCEDURE — 3074F SYST BP LT 130 MM HG: CPT | Performed by: PHYSICIAN ASSISTANT

## 2024-08-15 RX ORDER — HYDROCHLOROTHIAZIDE 12.5 MG/1
12.5 TABLET ORAL DAILY
COMMUNITY

## 2024-08-15 RX ORDER — PREGABALIN 75 MG/1
75 CAPSULE ORAL 2 TIMES DAILY
COMMUNITY

## 2024-08-15 RX ORDER — CETIRIZINE HYDROCHLORIDE 10 MG/1
10 TABLET ORAL DAILY
COMMUNITY

## 2024-08-15 RX ORDER — AMOXICILLIN 875 MG/1
875 TABLET, COATED ORAL 2 TIMES DAILY
COMMUNITY

## 2024-08-15 NOTE — LETTER
August 15, 2024       No Recipients    Patient: Makayla Chan   YOB: 1965   Date of Visit: 8/15/2024       Dear ABDULLAHI Baltazar    Makayla Chan was in my office today. Below is a copy of my note.    If you have questions, please do not hesitate to call me. I look forward to following Makayla along with you.         Sincerely,        GARY Zhu        CC:   No Recipients    Problem list     Subjective  Makayla Chan is a 59 y.o. female     Chief Complaint   Patient presents with   • 8 month testing   Problem list  1.  Nonobstructive coronary disease  1.1 stress test February 2023 with apical ischemia noted and preserved LV function  1.2 cardiac CTA eventually was performed in follow-up of symptoms.  This suggested potentially diffuse disease.  She had a follow-up catheterization, performed 5/2023.  This supported near normal coronaries with preserved systolic function.  2.  Preserved systolic function  3.  Hypertension  4.  Dyslipidemia  5.  Symptomatic bradycardia status post pacemaker implant    HPI    Patient is a 59-year-old presenting back to the office for routine assessment.  Patient has done remarkably well.  Last visit, she was evaluated and placed on isosorbide to treat for vasospastic angina.    Patient describes feeling remarkable.  She feels that this medication has significantly improved her symptoms and completely resolved her pain.  She is so pleased on how improved she feels.  She actually describes recently going  walking approximately 5 miles and did not complain of any chest pain.  Patient does not complain of dyspnea.  No PND or orthopnea.    She does not describe palpitating nor does she complain of dysrhythmic symptoms.  She is stable otherwise.    Current Outpatient Medications on File Prior to Visit   Medication Sig Dispense Refill   • amoxicillin (AMOXIL) 875 MG tablet Take 1 tablet by mouth 2 (Two) Times a Day.     • aspirin 81 MG EC tablet Take 1 tablet by  mouth Daily. 30 tablet 5   • atorvastatin (LIPITOR) 20 MG tablet 1 tablet Daily.     • calcium carbonate (OS-ROSA) 600 MG tablet Take 800 mg by mouth Daily.     • cetirizine (zyrTEC) 10 MG tablet Take 1 tablet by mouth Daily.     • clonazePAM (KlonoPIN) 1 MG tablet Take  by mouth 2 (Two) Times a Day.     • hydroCHLOROthiazide 12.5 MG tablet Take 1 tablet by mouth Daily.     • HYDROcodone-acetaminophen (NORCO)  MG per tablet 3 (Three) Times a Day As Needed.     • isosorbide mononitrate (IMDUR) 30 MG 24 hr tablet Take 1 tablet by mouth Every Morning. 90 tablet 3   • lisinopril (PRINIVIL,ZESTRIL) 40 MG tablet Take 1 tablet by mouth Daily.     • meloxicam (MOBIC) 7.5 MG tablet 1 tablet Daily.     • naloxone (NARCAN) 4 MG/0.1ML nasal spray 1 spray into the nostril(s) as directed by provider As Needed.     • nitroglycerin (NITROSTAT) 0.4 MG SL tablet 1 under the tongue as needed for angina, may repeat q5mins for up three doses 25 tablet 0   • omeprazole (priLOSEC) 40 MG capsule Take 1 capsule by mouth Daily.     • pregabalin (LYRICA) 75 MG capsule Take 1 capsule by mouth 2 (Two) Times a Day.     • TiZANidine (ZANAFLEX) 2 MG capsule Take 1 capsule by mouth 3 (Three) Times a Day As Needed for Muscle Spasms.       No current facility-administered medications on file prior to visit.       Morphine, Tramadol, and Oxycodone    Past Medical History:   Diagnosis Date   • Nerve damage     Damian hands and arms       Social History     Socioeconomic History   • Marital status: Single   Tobacco Use   • Smoking status: Former     Current packs/day: 0.00     Types: Cigarettes     Quit date:      Years since quittin.6   • Smokeless tobacco: Never   Substance and Sexual Activity   • Alcohol use: Never   • Drug use: Never   • Sexual activity: Defer       Family History   Problem Relation Age of Onset   • Heart disease Mother    • Heart disease Father        Review of Systems   Constitutional: Negative.  Negative for activity  change, appetite change, chills, fatigue and fever.   HENT: Negative.  Negative for congestion, sinus pressure and sinus pain.    Eyes: Negative.  Negative for visual disturbance.   Respiratory: Negative.  Positive for shortness of breath. Negative for apnea, cough, chest tightness and wheezing.    Cardiovascular:  Positive for leg swelling. Negative for chest pain and palpitations.   Gastrointestinal: Negative.  Negative for blood in stool.   Endocrine: Negative.  Negative for cold intolerance and heat intolerance.   Genitourinary: Negative.  Negative for hematuria.   Musculoskeletal: Negative.  Negative for gait problem.   Skin: Negative.  Negative for color change, rash and wound.   Neurological:  Positive for dizziness. Negative for syncope, weakness, light-headedness, numbness and headaches.   Hematological:  Bruises/bleeds easily.   Psychiatric/Behavioral: Negative.  Negative for sleep disturbance.        Objective  Vitals:    08/15/24 1503   BP: 110/63   BP Location: Right arm   Patient Position: Sitting   Cuff Size: Adult   Pulse: 93   SpO2: 98%   Weight: 71.2 kg (157 lb)      /63 (BP Location: Right arm, Patient Position: Sitting, Cuff Size: Adult)   Pulse 93   Wt 71.2 kg (157 lb)   SpO2 98%     Lab Results (most recent)       None            Physical Exam  Vitals and nursing note reviewed.   Constitutional:       General: She is not in acute distress.     Appearance: Normal appearance. She is well-developed.   HENT:      Head: Normocephalic and atraumatic.   Eyes:      General: No scleral icterus.        Right eye: No discharge.         Left eye: No discharge.      Conjunctiva/sclera: Conjunctivae normal.   Neck:      Vascular: No carotid bruit.   Cardiovascular:      Rate and Rhythm: Normal rate and regular rhythm.      Heart sounds: Normal heart sounds. No murmur heard.     No friction rub. No gallop.   Pulmonary:      Effort: Pulmonary effort is normal. No respiratory distress.      Breath  sounds: Normal breath sounds. No wheezing or rales.   Chest:      Chest wall: No tenderness.   Musculoskeletal:      Right lower leg: No edema.      Left lower leg: No edema.   Skin:     General: Skin is warm and dry.      Coloration: Skin is not pale.      Findings: No erythema or rash.   Neurological:      Mental Status: She is alert and oriented to person, place, and time.      Cranial Nerves: No cranial nerve deficit.   Psychiatric:         Behavior: Behavior normal.         Procedure    ECG 12 Lead    Date/Time: 8/15/2024 3:08 PM  Performed by: Sav Sotelo PA    Authorized by: Sav Sotelo PA  Comparison: compared with previous ECG from 3/9/2023  Comparison to previous ECG: EKG demonstrates ventricular pacing at 86 bpm with no acute ST changes             Assessment & Plan    Problems Addressed this Visit          Cardiac and Vasculature    Primary hypertension - Primary    Relevant Medications    hydroCHLOROthiazide 12.5 MG tablet    Pure hypercholesterolemia    Vasospastic angina     Diagnoses         Codes Comments    Primary hypertension    -  Primary ICD-10-CM: I10  ICD-9-CM: 401.9     Pure hypercholesterolemia     ICD-10-CM: E78.00  ICD-9-CM: 272.0     Vasospastic angina     ICD-10-CM: I20.1  ICD-9-CM: 413.1             Recommendations  1.  Patient is a 59-year-old female presenting back to the office for routine assessment.  She has what appears to be vasospastic angina treated appropriately with isosorbide and symptoms have resolved.  She feels remarkable.  For now, nothing further but continued medical management.  Last pacemaker interrogation showed no abnormalities.  We discussed with her to set back up for routine interrogations.    2.  Patient with baseline hypertension doing well medical therapy.  I will make no changes.    3.  Patient with dyslipidemia currently on statin therapy.  She has labs performed by primary.  We will see her back for follow-up in 6 months or sooner as  symptoms discussed.  Follow-up with primary as scheduled.         Patient brought in medicine bottles to appointment, they have been gone through with the patient. Med list was updated in the chart.         Electronically signed by:

## 2024-08-15 NOTE — PROGRESS NOTES
Problem list     Subjective   Makayla Chan is a 59 y.o. female     Chief Complaint   Patient presents with    8 month testing   Problem list  1.  Nonobstructive coronary disease  1.1 stress test February 2023 with apical ischemia noted and preserved LV function  1.2 cardiac CTA eventually was performed in follow-up of symptoms.  This suggested potentially diffuse disease.  She had a follow-up catheterization, performed 5/2023.  This supported near normal coronaries with preserved systolic function.  2.  Preserved systolic function  3.  Hypertension  4.  Dyslipidemia  5.  Symptomatic bradycardia status post pacemaker implant    HPI    Patient is a 59-year-old presenting back to the office for routine assessment.  Patient has done remarkably well.  Last visit, she was evaluated and placed on isosorbide to treat for vasospastic angina.    Patient describes feeling remarkable.  She feels that this medication has significantly improved her symptoms and completely resolved her pain.  She is so pleased on how improved she feels.  She actually describes recently going  walking approximately 5 miles and did not complain of any chest pain.  Patient does not complain of dyspnea.  No PND or orthopnea.    She does not describe palpitating nor does she complain of dysrhythmic symptoms.  She is stable otherwise.    Current Outpatient Medications on File Prior to Visit   Medication Sig Dispense Refill    amoxicillin (AMOXIL) 875 MG tablet Take 1 tablet by mouth 2 (Two) Times a Day.      aspirin 81 MG EC tablet Take 1 tablet by mouth Daily. 30 tablet 5    atorvastatin (LIPITOR) 20 MG tablet 1 tablet Daily.      calcium carbonate (OS-ROSA) 600 MG tablet Take 800 mg by mouth Daily.      cetirizine (zyrTEC) 10 MG tablet Take 1 tablet by mouth Daily.      clonazePAM (KlonoPIN) 1 MG tablet Take  by mouth 2 (Two) Times a Day.      hydroCHLOROthiazide 12.5 MG tablet Take 1 tablet by mouth Daily.      HYDROcodone-acetaminophen (NORCO)   MG per tablet 3 (Three) Times a Day As Needed.      isosorbide mononitrate (IMDUR) 30 MG 24 hr tablet Take 1 tablet by mouth Every Morning. 90 tablet 3    lisinopril (PRINIVIL,ZESTRIL) 40 MG tablet Take 1 tablet by mouth Daily.      meloxicam (MOBIC) 7.5 MG tablet 1 tablet Daily.      naloxone (NARCAN) 4 MG/0.1ML nasal spray 1 spray into the nostril(s) as directed by provider As Needed.      nitroglycerin (NITROSTAT) 0.4 MG SL tablet 1 under the tongue as needed for angina, may repeat q5mins for up three doses 25 tablet 0    omeprazole (priLOSEC) 40 MG capsule Take 1 capsule by mouth Daily.      pregabalin (LYRICA) 75 MG capsule Take 1 capsule by mouth 2 (Two) Times a Day.      TiZANidine (ZANAFLEX) 2 MG capsule Take 1 capsule by mouth 3 (Three) Times a Day As Needed for Muscle Spasms.       No current facility-administered medications on file prior to visit.       Morphine, Tramadol, and Oxycodone    Past Medical History:   Diagnosis Date    Nerve damage     Damian hands and arms       Social History     Socioeconomic History    Marital status: Single   Tobacco Use    Smoking status: Former     Current packs/day: 0.00     Types: Cigarettes     Quit date:      Years since quittin.6    Smokeless tobacco: Never   Substance and Sexual Activity    Alcohol use: Never    Drug use: Never    Sexual activity: Defer       Family History   Problem Relation Age of Onset    Heart disease Mother     Heart disease Father        Review of Systems   Constitutional: Negative.  Negative for activity change, appetite change, chills, fatigue and fever.   HENT: Negative.  Negative for congestion, sinus pressure and sinus pain.    Eyes: Negative.  Negative for visual disturbance.   Respiratory: Negative.  Positive for shortness of breath. Negative for apnea, cough, chest tightness and wheezing.    Cardiovascular:  Positive for leg swelling. Negative for chest pain and palpitations.   Gastrointestinal: Negative.  Negative for  blood in stool.   Endocrine: Negative.  Negative for cold intolerance and heat intolerance.   Genitourinary: Negative.  Negative for hematuria.   Musculoskeletal: Negative.  Negative for gait problem.   Skin: Negative.  Negative for color change, rash and wound.   Neurological:  Positive for dizziness. Negative for syncope, weakness, light-headedness, numbness and headaches.   Hematological:  Bruises/bleeds easily.   Psychiatric/Behavioral: Negative.  Negative for sleep disturbance.        Objective   Vitals:    08/15/24 1503   BP: 110/63   BP Location: Right arm   Patient Position: Sitting   Cuff Size: Adult   Pulse: 93   SpO2: 98%   Weight: 71.2 kg (157 lb)      /63 (BP Location: Right arm, Patient Position: Sitting, Cuff Size: Adult)   Pulse 93   Wt 71.2 kg (157 lb)   SpO2 98%     Lab Results (most recent)       None            Physical Exam  Vitals and nursing note reviewed.   Constitutional:       General: She is not in acute distress.     Appearance: Normal appearance. She is well-developed.   HENT:      Head: Normocephalic and atraumatic.   Eyes:      General: No scleral icterus.        Right eye: No discharge.         Left eye: No discharge.      Conjunctiva/sclera: Conjunctivae normal.   Neck:      Vascular: No carotid bruit.   Cardiovascular:      Rate and Rhythm: Normal rate and regular rhythm.      Heart sounds: Normal heart sounds. No murmur heard.     No friction rub. No gallop.   Pulmonary:      Effort: Pulmonary effort is normal. No respiratory distress.      Breath sounds: Normal breath sounds. No wheezing or rales.   Chest:      Chest wall: No tenderness.   Musculoskeletal:      Right lower leg: No edema.      Left lower leg: No edema.   Skin:     General: Skin is warm and dry.      Coloration: Skin is not pale.      Findings: No erythema or rash.   Neurological:      Mental Status: She is alert and oriented to person, place, and time.      Cranial Nerves: No cranial nerve deficit.    Psychiatric:         Behavior: Behavior normal.         Procedure     ECG 12 Lead    Date/Time: 8/15/2024 3:08 PM  Performed by: Sav Sotelo PA    Authorized by: Sav Sotelo PA  Comparison: compared with previous ECG from 3/9/2023  Comparison to previous ECG: EKG demonstrates ventricular pacing at 86 bpm with no acute ST changes             Assessment & Plan     Problems Addressed this Visit          Cardiac and Vasculature    Primary hypertension - Primary    Relevant Medications    hydroCHLOROthiazide 12.5 MG tablet    Pure hypercholesterolemia    Vasospastic angina     Diagnoses         Codes Comments    Primary hypertension    -  Primary ICD-10-CM: I10  ICD-9-CM: 401.9     Pure hypercholesterolemia     ICD-10-CM: E78.00  ICD-9-CM: 272.0     Vasospastic angina     ICD-10-CM: I20.1  ICD-9-CM: 413.1             Recommendations  1.  Patient is a 59-year-old female presenting back to the office for routine assessment.  She has what appears to be vasospastic angina treated appropriately with isosorbide and symptoms have resolved.  She feels remarkable.  For now, nothing further but continued medical management.  Last pacemaker interrogation showed no abnormalities.  We discussed with her to set back up for routine interrogations.    2.  Patient with baseline hypertension doing well medical therapy.  I will make no changes.    3.  Patient with dyslipidemia currently on statin therapy.  She has labs performed by primary.  We will see her back for follow-up in 6 months or sooner as symptoms discussed.  Follow-up with primary as scheduled.         Patient brought in medicine bottles to appointment, they have been gone through with the patient. Med list was updated in the chart.         Electronically signed by:

## 2024-11-12 RX ORDER — ISOSORBIDE MONONITRATE 30 MG/1
30 TABLET, EXTENDED RELEASE ORAL EVERY MORNING
Qty: 90 TABLET | Refills: 3 | Status: SHIPPED | OUTPATIENT
Start: 2024-11-12

## 2024-11-12 NOTE — TELEPHONE ENCOUNTER
Caller: Makayla Chan    Relationship: Self    Best call back number: [phone]      Requested Prescriptions:   Requested Prescriptions     Pending Prescriptions Disp Refills    isosorbide mononitrate (IMDUR) 30 MG 24 hr tablet 90 tablet 3     Sig: Take 1 tablet by mouth Every Morning.        Pharmacy where request should be sent: [facility] [id number] [place] [id number] - [phone] [facility] [phone] FX     Last office visit with prescribing clinician: 6/19/2023   Last telemedicine visit with prescribing clinician: Visit date not found   Next office visit with prescribing clinician: Visit date not found     Additional details provided by patient:     Does the patient have less than a 3 day supply:  [] Yes  [x] No    Would you like a call back once the refill request has been completed: [] Yes [] No    If the office needs to give you a call back, can they leave a voicemail: [] Yes [] No    Kristie Madrid Rep   11/12/24 09:16 EST

## 2025-04-14 ENCOUNTER — TELEPHONE (OUTPATIENT)
Dept: CARDIOLOGY | Facility: CLINIC | Age: 60
End: 2025-04-14

## 2025-04-14 NOTE — TELEPHONE ENCOUNTER
Caller: Makayla Chan    Relationship: Self    Best call back number: [phone]     What is the best time to reach you: ANY    What was the call regarding: PT REPORTS THAT THEY WERE GIVEN A CALL BUT THERE IS NO DOCUMENTATION IN THE CHART ABOUT THIS. IF THIS WAS NOT A MISTAKE, PLEASE CALL PT BACK.     Is it okay if the provider responds through MyChart: CALL

## 2025-07-18 ENCOUNTER — OFFICE VISIT (OUTPATIENT)
Dept: CARDIOLOGY | Facility: CLINIC | Age: 60
End: 2025-07-18
Payer: COMMERCIAL

## 2025-07-18 DIAGNOSIS — I45.9 CARDIAC CONDUCTION DISORDER: Primary | ICD-10-CM

## 2025-08-18 ENCOUNTER — OFFICE VISIT (OUTPATIENT)
Dept: CARDIOLOGY | Facility: CLINIC | Age: 60
End: 2025-08-18
Payer: COMMERCIAL

## 2025-08-18 VITALS
HEART RATE: 83 BPM | OXYGEN SATURATION: 97 % | DIASTOLIC BLOOD PRESSURE: 75 MMHG | SYSTOLIC BLOOD PRESSURE: 138 MMHG | WEIGHT: 142 LBS

## 2025-08-18 DIAGNOSIS — I45.9 CARDIAC CONDUCTION DISORDER: ICD-10-CM

## 2025-08-18 DIAGNOSIS — R55 SYNCOPE AND COLLAPSE: Primary | ICD-10-CM

## 2025-08-18 DIAGNOSIS — I10 PRIMARY HYPERTENSION: ICD-10-CM

## 2025-08-18 RX ORDER — LISINOPRIL 10 MG/1
10 TABLET ORAL DAILY
Qty: 30 TABLET | Refills: 11 | Status: SHIPPED | OUTPATIENT
Start: 2025-08-18